# Patient Record
Sex: MALE | Race: BLACK OR AFRICAN AMERICAN | Employment: FULL TIME | URBAN - METROPOLITAN AREA
[De-identification: names, ages, dates, MRNs, and addresses within clinical notes are randomized per-mention and may not be internally consistent; named-entity substitution may affect disease eponyms.]

---

## 2019-08-25 ENCOUNTER — HOSPITAL ENCOUNTER (EMERGENCY)
Facility: CLINIC | Age: 39
Discharge: HOME | End: 2019-08-25
Attending: NURSE PRACTITIONER | Admitting: NURSE PRACTITIONER
Payer: COMMERCIAL

## 2019-08-25 VITALS
RESPIRATION RATE: 16 BRPM | BODY MASS INDEX: 24.79 KG/M2 | HEIGHT: 77 IN | WEIGHT: 210 LBS | TEMPERATURE: 98.3 F | OXYGEN SATURATION: 99 % | SYSTOLIC BLOOD PRESSURE: 130 MMHG | DIASTOLIC BLOOD PRESSURE: 93 MMHG

## 2019-08-25 DIAGNOSIS — K02.9 DENTAL CARIES: ICD-10-CM

## 2019-08-25 DIAGNOSIS — K08.89 PAIN, DENTAL: ICD-10-CM

## 2019-08-25 PROCEDURE — 99282 EMERGENCY DEPT VISIT SF MDM: CPT

## 2019-08-25 RX ORDER — BUPIVACAINE HYDROCHLORIDE AND EPINEPHRINE 5; 5 MG/ML; UG/ML
INJECTION, SOLUTION PERINEURAL
Status: DISCONTINUED
Start: 2019-08-25 | End: 2019-08-25 | Stop reason: HOSPADM

## 2019-08-25 RX ORDER — PENICILLIN V POTASSIUM 500 MG/1
500 TABLET, FILM COATED ORAL 3 TIMES DAILY
Qty: 21 TABLET | Refills: 0 | Status: SHIPPED | OUTPATIENT
Start: 2019-08-25 | End: 2019-09-01

## 2019-08-25 ASSESSMENT — ENCOUNTER SYMPTOMS
FEVER: 0
VOICE CHANGE: 0
CHILLS: 0
TROUBLE SWALLOWING: 0

## 2019-08-25 ASSESSMENT — MIFFLIN-ST. JEOR: SCORE: 1989.93

## 2019-08-25 NOTE — ED AVS SNAPSHOT
[facility] Emergency Department  [address]  [facility] [address]  Phone:  [phone]  Fax:  [phone]                                    Araceli Humphries   MRN: [id number]    Department:  [facility] Emergency Department   Date of Visit:  8/25/2019           After Visit Summary Signature Page    I have received my discharge instructions, and my questions have been answered. I have discussed any challenges I see with this plan with the nurse or doctor.    ..........................................................................................................................................  Patient/Patient Representative Signature      ..........................................................................................................................................  Patient Representative Print Name and Relationship to Patient    ..................................................               ................................................  Date                                   Time    ..........................................................................................................................................  Reviewed by Signature/Title    ...................................................              ..............................................  Date                                               Time          22EPIC Rev 08/18

## 2019-08-25 NOTE — DISCHARGE INSTRUCTIONS
Take ibuprofen 600 mg every 6 hours for the next 3-5 days or until followup with dentist. If given other medications used as directed.  Use Oil of Clove topically. If given antibiotics make sure and finish the entire course. Ultimately you need to follow up with your dentist. If you cannot get into your dentist in the next couple of days I have given you a list of other options for dental care. You will need to get further pain management from your dentist. Return to emergency room if you develop high fevers, difficulty breathing, shortness of breath, the inability to swallow your own saliva, or for other concerns.     [facility] Dental [facility]  [email or web address]   [address] [facility]   Directions   [phone]    [facility]  [email or web address]  Emergency Dental Clinic You Can Rely On. Open 24 Hours. Call Now.  [address] [address] [address]   Directions   [phone]    [facility]  Address: [address] [place] [address], [address] [address]   Phone: [phone]    [facility]  [address] [place] [address] [phone]  E-mail: [email or web address]  Website      [facility]  ([facility])  [address] [facility]  [place]  [address] [phone]  Website    [facility]  [address] [place], [place] [id number] [phone]  Website    [facility]  [address] [facility]  [place] [address]  Main: [phone]  Appointments: [phone]  Website    [facility] (Dental cleaning,  deep cleaning - periodontal therapy  and x-rays)  [address] [place], [place] [id number] [phone]    [facility]  [address] [place], [place]  [id number] [phone]  Website    [facility] (Sliding fee scale dental services)  [address] [place], [place] [id number] [phone]    [facility] (Dental cleaning,  deep  cleaning- periodontal therapy  and x-rays)  [address] [place] [address]  [phone]    [facility] (Multiple locations -- call for details)  [address] [place] [address]  [phone]  Website    [facility]  [address] [place] [address]  [phone]  Website    [facility]  [address] [place] [address]  [phone]  Website    [facility]  [address] [place] [address]  [phone]  Website    [facility]  [address] [place] [address]  [phone]  Website    [facility]  [address] [address]  [phone]  Website    [facility] [place]  [address] [place] [address]  [phone]  Website    [facility]  Dr. Jose Carlos Crisostomo  [facility]  [address] [place] [address]  [phone]  Website    [facility]  [address] [place] [address]  [phone]  Website    [facility]  [address] [place] [address]  [phone]  Website    [facility] (Dental cleaning,  deep cleaning- periodontal therapy  and x-rays)  [address] [place] [address]  [phone]    [facility]  [address] [place] [address]  [phone]  Website    [facility] (open to everyone)  [address] [place] [address]  [phone]  Website    [facility] (Sliding fee scale dental services and some state medical assistance programs accepted)  [address] [place] [address]  [phone]         [facility]  [address] [facility] [address] [facility]  [place] [address]  [phone]  Website    [facility]  [address] [place]  [address]  [phone]  E-mail: [email or web address]  Website     [facility] (Sliding fee scale dental services for all)  [address] [place] [address]  [phone]  Website    [facility] (Dental cleaning,  deep cleaning- periodontal therapy  and x-rays).  [address] [place] [address]  [phone]  Website    [facility]  [address] [place] [address]  [phone]  Website    [facility]  [address] [place]  [address]  [phone]  Website    [facility]  [address] [place], [place] [id number] [phone]  [facility]  [address] [place] [address]  [phone]  Website    [facility]  Dental clinic for children with special needs. Accepts [place] (must have diagnosed medical condition, i.e.:  autism, CP, chronic disease or condition)  [address] [facility]  [place], [place]  [address] [phone]  Website    [facility]  [address] [place] [address]  [phone]  Website    [facility]  [address] [place] [address]  [phone]  Website    [facility]  [address] [place], [place]  [address] [phone]  Website    [facility]  Discount plan available.  Call for details.  [address] [place] [address]  [phone]    [facility] (Dental services provided by mobile dental unit)  [facility]AN [facility]  [address] [address]  [phone]    [facility] (Dr. Sohan Valencia - specific criteria and medical necessity required)  [facility] Professional [facility], 2nd Floor, [address]  [address] [place] [address]  [address]  [phone]  Website    [facility]  [address] [facility] [place]  [phone] (main clinic)  Website  After Hours: Adult emergencies [phone] Pediatric emergencies [phone]     Dental Center  [address] [place] [address]  [phone]  Website    [facility]  [address] [place] [address]  [phone]  Website    Dental Clinics Accepting [facility]    Child and Teen Checkups  [facility]  [phone]    [facility]  [address] [facility] [address]  [place], [place]  [phone]    The [address]  [phone]    [facility]  [address] [facility]  [phone]    [facility]  [address] [facility]  [phone]     of  Dental School  [address] [facility]  [phone]    [facility]  [address] [facility]  [phone]    [facility]  [address]  [phone]    [place] [facility]  [place]  [phone]    [address]  [phone]    [facility]  [address] [facility]  [phone]    [facility]  [address] [place] [address]  [phone]    [facility]  [address] [facility]  [phone]    [facility]  [address] [facility]  [phone]    ADDITIONAL RESOURCES    [place] [facility]  [phone]    [facility]  [phone]    [facility]  [phone]    This web site contains many locations and information about what services they provide:    [email or web address]/

## 2019-08-25 NOTE — ED PROVIDER NOTES
"  History     Chief Complaint:  Dental Pain    HPI   Araceli Humphries is a 39 year old male who presents to the emergency department today with dental pain. The patient broke his right last bottom molar initially 3 months ago. 3 days ago \"another piece came off.\". He was not having pain until this morning with associated swelling. Patient has been taking Ibuprofen for the pain every 8 hours for the last 2 days.     Allergies:  No Known Drug Allergies     Medications:    The patient is not currently taking any prescribed medications.    Past Medical History:    The patient denies any relevant past medical history.    Past Surgical History:    History reviewed. No pertinent past surgical history.    Family History:    History reviewed. No pertinent family history.     Social History:  The patient was accompanied to the ED by friend.  Smoking Status: Never  Smokeless Tobacco: Never     Review of Systems   Constitutional: Negative for chills and fever.   HENT: Positive for dental problem (right last bottom molar pain and swelling). Negative for trouble swallowing and voice change.    All other systems reviewed and are negative.      Physical Exam     Patient Vitals for the past 24 hrs:   BP Temp Temp src Heart Rate Resp SpO2 Height Weight   08/25/19 1035 (!) 130/93 98.3  F (36.8  C) Oral 116 16 99 % 1.956 m (6' 5\") 95.3 kg (210 lb)      Physical Exam  General: Well-nourished, No obvious discomfort  Eyes: PERRL, conjunctivae pink no scleral icterus or conjunctival injection  ENT:  Moist mucus membranes.  Tooth # 31 with missing fragment  (appears old) no swelling, non tender.  No drainage.  No abscess along gumline.  No cheek or submandibular edema.  No trismus.  Normal voice.  Respiratory:  Normal respiratory effort. No cough  CV: Normal rate   Musculoskeletal: No peripheral edema or calf tenderness  Neuro: Alert and oriented to person/place/time  Skin: Warm and dry. Normal appearance of visualized exposed " skin  Psychiatric: Affect normal. Normal personal interaction. Good eye contact.  Emergency Department Course   Emergency Department Course:  Nursing notes and vitals reviewed.  2240: I performed an exam of the patient as documented above.   Patient refused a dental block.   2240: Findings and plan explained to the Patient. Patient discharged home with instructions regarding supportive care, medications, and reasons to return. The importance of close follow-up was reviewed. The patient was prescribed Veetid.    I personally answered all related questions prior to discharge.     Impression & Plan    Medical Decision Making:  The patient presents with a toothache.  There is no abscess detected around the tooth amenable to incision and drainage.  The differential diagnosis includes: cracked tooth syndrome, pulpitis, sub-apical abscess, amongst others.  There is no evidence of  infection, significant facial swelling, or Eliu's angina. There are no posterior pharyngeal space infections detected. Appropriate use of Ibuprofen discussed. Pen VK given. Dental clinic list given.  Follow up with a dentist/endodontist in the coming days is indicated for further work up and treatment.    Diagnosis:    ICD-10-CM    1. Dental caries K02.9    2. Pain, dental K08.89        Disposition:  discharged to home    Discharge Medications:  New Prescriptions    PENICILLIN V (VEETID) 500 MG TABLET    Take 1 tablet (500 mg) by mouth 3 times daily for 7 days       Scribe Disclosure:  I, Allyson Beaver MD, am serving as a scribe at 10:41 AM on 8/25/2019 to document services personally performed by Willam Cortés APRN based on my observations and the provider's statements to me.    8/25/2019   [facility] EMERGENCY DEPARTMENT       Willam Cortés APRN CNP  08/25/19 1121